# Patient Record
Sex: FEMALE | Race: ASIAN | NOT HISPANIC OR LATINO
[De-identification: names, ages, dates, MRNs, and addresses within clinical notes are randomized per-mention and may not be internally consistent; named-entity substitution may affect disease eponyms.]

---

## 2022-07-06 PROBLEM — Z00.00 ENCOUNTER FOR PREVENTIVE HEALTH EXAMINATION: Status: ACTIVE | Noted: 2022-07-06

## 2022-07-12 ENCOUNTER — APPOINTMENT (OUTPATIENT)
Dept: CARDIOLOGY | Facility: CLINIC | Age: 42
End: 2022-07-12

## 2022-07-12 ENCOUNTER — NON-APPOINTMENT (OUTPATIENT)
Age: 42
End: 2022-07-12

## 2022-07-12 VITALS
BODY MASS INDEX: 21.9 KG/M2 | TEMPERATURE: 97.6 F | SYSTOLIC BLOOD PRESSURE: 126 MMHG | DIASTOLIC BLOOD PRESSURE: 86 MMHG | HEIGHT: 61.81 IN | OXYGEN SATURATION: 98 % | RESPIRATION RATE: 16 BRPM | HEART RATE: 83 BPM | WEIGHT: 119 LBS

## 2022-07-12 DIAGNOSIS — R07.89 OTHER CHEST PAIN: ICD-10-CM

## 2022-07-12 PROCEDURE — 93015 CV STRESS TEST SUPVJ I&R: CPT

## 2022-07-12 PROCEDURE — 99214 OFFICE O/P EST MOD 30 MIN: CPT | Mod: 25

## 2022-07-12 PROCEDURE — 93000 ELECTROCARDIOGRAM COMPLETE: CPT | Mod: 59

## 2022-07-12 PROCEDURE — ZZZZZ: CPT

## 2022-07-12 PROCEDURE — 93306 TTE W/DOPPLER COMPLETE: CPT

## 2022-07-12 NOTE — HISTORY OF PRESENT ILLNESS
[FreeTextEntry1] : 41 year-old female with HLD presents for evaluation of CP.  Patient reports that for the past few months she has been experiencing SSCP, described as tightness, not related to exertion, non-tender to touch, continuous in nature.  She feels the need to take deep breaths.  Patient reports belching.  Patient reports GI issues.  She had EGD several months ago which showed gastritis but did not require treatment.  Patient denies SOB.  Patient reports palpitations when anxious.  Patient reports 2 syncopal episodes over 20 years ago.  Once when her grandmother passed away and the other time after shower.  I advised patient to undergo an echocardiogram and a treadmill stress test.  I advised patient to have a CXR to rule out lung pathology.  I will consider PPI if CXR negative.

## 2022-07-15 ENCOUNTER — NON-APPOINTMENT (OUTPATIENT)
Age: 42
End: 2022-07-15